# Patient Record
Sex: FEMALE | Race: OTHER | HISPANIC OR LATINO | URBAN - METROPOLITAN AREA
[De-identification: names, ages, dates, MRNs, and addresses within clinical notes are randomized per-mention and may not be internally consistent; named-entity substitution may affect disease eponyms.]

---

## 2017-01-26 ENCOUNTER — EMERGENCY (EMERGENCY)
Facility: HOSPITAL | Age: 32
LOS: 1 days | Discharge: HOME | End: 2017-01-26
Attending: EMERGENCY MEDICINE | Admitting: EMERGENCY MEDICINE
Payer: SELF-PAY

## 2017-01-26 VITALS
DIASTOLIC BLOOD PRESSURE: 77 MMHG | OXYGEN SATURATION: 99 % | SYSTOLIC BLOOD PRESSURE: 115 MMHG | TEMPERATURE: 98 F | HEART RATE: 65 BPM | RESPIRATION RATE: 17 BRPM

## 2017-01-26 VITALS
HEIGHT: 61.5 IN | HEART RATE: 64 BPM | WEIGHT: 154.98 LBS | SYSTOLIC BLOOD PRESSURE: 109 MMHG | DIASTOLIC BLOOD PRESSURE: 71 MMHG | RESPIRATION RATE: 18 BRPM | TEMPERATURE: 98 F | OXYGEN SATURATION: 100 %
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
## 2017-01-26 DIAGNOSIS — M54.6 PAIN IN THORACIC SPINE: ICD-10-CM
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
## 2017-01-26 DIAGNOSIS — S29.012A STRAIN OF MUSCLE AND TENDON OF BACK WALL OF THORAX, INITIAL ENCOUNTER: ICD-10-CM
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
## 2017-01-26 PROCEDURE — 99284 EMERGENCY DEPT VISIT MOD MDM: CPT

## 2017-01-26 PROCEDURE — 96372 THER/PROPH/DIAG INJ SC/IM: CPT

## 2017-01-26 PROCEDURE — 99283 EMERGENCY DEPT VISIT LOW MDM: CPT | Mod: 25

## 2017-01-26 RX ORDER — METHOCARBAMOL 500 MG/1
1 TABLET, FILM COATED ORAL
Qty: 21 | Refills: 0 | OUTPATIENT
Start: 2017-01-26 | End: 2017-02-02

## 2017-01-26 RX ORDER — KETOROLAC TROMETHAMINE 30 MG/ML
30 SYRINGE (ML) INJECTION ONCE
Qty: 0 | Refills: 0 | Status: DISCONTINUED | OUTPATIENT
Start: 2017-01-26 | End: 2017-01-26

## 2017-01-26 RX ORDER — IBUPROFEN 200 MG
1 TABLET ORAL
Qty: 20 | Refills: 0 | OUTPATIENT
Start: 2017-01-26 | End: 2017-01-31

## 2017-01-26 RX ADMIN — Medication 30 MILLIGRAM(S): at 16:53

## 2017-01-26 RX ADMIN — Medication 30 MILLIGRAM(S): at 16:54

## 2017-01-26 NOTE — ED ADULT TRIAGE NOTE - CHIEF COMPLAINT QUOTE
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

## 2017-01-26 NOTE — ED ADULT NURSE NOTE - NS ED NURSE DC INFO COMPLEXITY
Returned Demonstration/Verbalized Understanding/Patient asked questions/Simple: Patient demonstrates quick and easy understanding

## 2017-01-26 NOTE — ED ADULT NURSE NOTE - OBJECTIVE STATEMENT
pt is a 30 y/o female c/o right shoulder pain after injuring it at work lifting cartons one week ago. pt took aleve for pain with minimal relief. denies any n/v/d, fever, chills, SOB, chest pain, C-spine tenderness, headache. a&ox3, no acute distress, able to move arm but with discomfort. resting comfortably in chair awaiting further evaluation. will continue to monitor.

## 2017-01-26 NOTE — ED ADULT NURSE NOTE - CHPI ED SYMPTOMS NEG
no deformity/no weakness/no abrasion/no difficulty bearing weight/no numbness/no tingling/no fever/no bruising

## 2017-01-26 NOTE — ED PROVIDER NOTE - PHYSICAL EXAMINATION
cervical strength intact  + R paraspinal muscle tenderness w many trigger points that reproduce the pain

## 2017-01-26 NOTE — ED PROVIDER NOTE - OBJECTIVE STATEMENT
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

## 2017-06-20 NOTE — ED ADULT NURSE NOTE - PAIN: PRESENCE, MLM
Called and informed mom. She has no further questions at this time.     Sarah Matta, Pediatric      complains of pain/discomfort

## 2023-04-29 NOTE — ED ADULT NURSE NOTE - TETANUS
Problem: Adult Inpatient Plan of Care  Goal: Plan of Care Review  Outcome: Ongoing, Progressing  Goal: Patient-Specific Goal (Individualized)  Outcome: Ongoing, Progressing  Goal: Absence of Hospital-Acquired Illness or Injury  Outcome: Ongoing, Progressing  Goal: Optimal Comfort and Wellbeing  Outcome: Ongoing, Progressing  Goal: Readiness for Transition of Care  Outcome: Ongoing, Progressing      unknown
